# Patient Record
Sex: FEMALE | Race: WHITE | Employment: OTHER | URBAN - NONMETROPOLITAN AREA
[De-identification: names, ages, dates, MRNs, and addresses within clinical notes are randomized per-mention and may not be internally consistent; named-entity substitution may affect disease eponyms.]

---

## 2020-08-27 ENCOUNTER — HOSPITAL ENCOUNTER (OUTPATIENT)
Dept: MRI IMAGING | Facility: OTHER | Age: 60
Discharge: HOME | End: 2020-08-27
Attending: NURSE PRACTITIONER | Admitting: FAMILY MEDICINE
Payer: COMMERCIAL

## 2020-08-27 DIAGNOSIS — G89.29 CHRONIC PAIN OF LEFT KNEE: ICD-10-CM

## 2020-08-27 DIAGNOSIS — G89.29 OTHER CHRONIC PAIN: ICD-10-CM

## 2020-08-27 DIAGNOSIS — M25.562 CHRONIC PAIN OF LEFT KNEE: ICD-10-CM

## 2020-08-27 PROCEDURE — 73721 MRI JNT OF LWR EXTRE W/O DYE: CPT | Mod: LT

## 2020-09-16 ENCOUNTER — OFFICE VISIT (OUTPATIENT)
Dept: ORTHOPEDICS | Facility: OTHER | Age: 60
End: 2020-09-16
Attending: ORTHOPAEDIC SURGERY
Payer: COMMERCIAL

## 2020-09-16 DIAGNOSIS — S83.232D COMPLEX TEAR OF MEDIAL MENISCUS OF LEFT KNEE AS CURRENT INJURY, SUBSEQUENT ENCOUNTER: Primary | ICD-10-CM

## 2020-09-16 PROCEDURE — 99024 POSTOP FOLLOW-UP VISIT: CPT | Performed by: ORTHOPAEDIC SURGERY

## 2020-09-16 NOTE — PROGRESS NOTES
Patient is here for follow up on her left knee scope.  Maria Fernanda Lopez LPN .....................9/16/2020 2:10 PM

## 2020-09-17 NOTE — PROGRESS NOTES
Visit Date:   2020      REASON FOR EVALUATION:  Left knee scope, partial meniscectomy.      HISTORY:  Jemma comes back after knee scope and partial meniscectomy.  Overall, reports doing fairly well at this point in time.  She does have some residual pain here in regards to the knee, but feels like she is at least trending here in the right direction at this point.  The patient is here for suture removal and examination at this point.      PHYSICAL EXAMINATION:  Left knee shows incisional site to be healing properly, no signs or symptoms of infection present.  Swelling is seen within the joint.  Neurovascular examination otherwise intact, and the range of motion tolerable at 0 to about 115 at this point in time.      IMPRESSION:  Knee scope, partial meniscectomy, left side, and chondroplasty.      PLAN:  Reassurances.  Ice, elevate, range of motion, quad strengthening.  If any residual symptoms in 3 weeks, would advise she does get sent forward on an injection in that scenario.         ELIS GALLO MD             D: 2020   T: 2020   MT: LUPILLO      Name:     EJMMA DE LA FUENTE   MRN:      [id number]        Account:      [id number]   :      1960           Visit Date:   2020      Document: [id number]

## 2021-02-02 ENCOUNTER — TRANSFERRED RECORDS (OUTPATIENT)
Dept: HEALTH INFORMATION MANAGEMENT | Facility: OTHER | Age: 61
End: 2021-02-02